# Patient Record
Sex: FEMALE | Race: WHITE
[De-identification: names, ages, dates, MRNs, and addresses within clinical notes are randomized per-mention and may not be internally consistent; named-entity substitution may affect disease eponyms.]

---

## 2017-03-28 ENCOUNTER — HOSPITAL ENCOUNTER (OUTPATIENT)
Dept: HOSPITAL 45 - C.MAMM | Age: 62
Discharge: HOME | End: 2017-03-28
Attending: OBSTETRICS & GYNECOLOGY
Payer: COMMERCIAL

## 2017-03-28 DIAGNOSIS — Z12.31: Primary | ICD-10-CM

## 2017-03-29 NOTE — MAMMOGRAPHY REPORT
BILATERAL DIGITAL SCREENING MAMMOGRAM TOMOSYNTHESIS WITH CAD: 3/28/2017

CLINICAL HISTORY: Routine screening.  Patient has no complaints.  





TECHNIQUE:  Breast tomosynthesis in addition to standard 2D mammography was performed. Current study
 was also evaluated with a Computer Aided Detection (CAD) system.  



COMPARISON: Comparison is made to exam dated:  3/17/2016 mammogram - [facility].  
 



BREAST COMPOSITION:  There are scattered areas of fibroglandular density in both breasts.  



FINDINGS: There are scattered benign-appearing calcifications in the breasts.  No new suspicious mas
s, architectural distortion or cluster of microcalcifications is seen.  



IMPRESSION:  ACR BI-RADS CATEGORY 1: NEGATIVE

There is no mammographic evidence of malignancy. A 1 year screening mammogram is recommended.  The p
atient will receive written notification of the results.  





Approximately 10% of breast cancers are not detected with mammography. A negative mammographic repor
t should not delay biopsy if a clinically suggestive mass is present.



Petra Lee M.D.          

ay/:3/28/2017 16:41:37  



Imaging Technologist: Lucie RATLIFF(EARLINE)(ORTEGA), [facility]

letter sent: Normal 1/2  

BI-RADS Code: ACR BI-RADS Category 1: Negative

## 2017-04-06 ENCOUNTER — HOSPITAL ENCOUNTER (OUTPATIENT)
Dept: HOSPITAL 45 - C.GI | Age: 62
Discharge: HOME | End: 2017-04-06
Attending: INTERNAL MEDICINE
Payer: COMMERCIAL

## 2017-04-06 VITALS
HEIGHT: 65 IN | WEIGHT: 216.45 LBS | HEIGHT: 65 IN | BODY MASS INDEX: 36.06 KG/M2 | WEIGHT: 216.45 LBS | BODY MASS INDEX: 36.06 KG/M2

## 2017-04-06 VITALS — OXYGEN SATURATION: 96 % | DIASTOLIC BLOOD PRESSURE: 69 MMHG | HEART RATE: 68 BPM | SYSTOLIC BLOOD PRESSURE: 115 MMHG

## 2017-04-06 VITALS — TEMPERATURE: 98.78 F

## 2017-04-06 DIAGNOSIS — E07.9: ICD-10-CM

## 2017-04-06 DIAGNOSIS — Z12.11: Primary | ICD-10-CM

## 2017-04-06 DIAGNOSIS — K64.8: ICD-10-CM

## 2017-04-06 DIAGNOSIS — D12.2: ICD-10-CM

## 2017-04-06 DIAGNOSIS — K62.1: ICD-10-CM

## 2017-04-06 DIAGNOSIS — K57.30: ICD-10-CM

## 2017-04-06 NOTE — ANESTHESIOLOGY PROGRESS NOTE
Anesthesia Post Op Note


Date & Time


Apr 6, 2017 at 14:20





Vital Signs


Pain Intensity:  5





 Vital Signs Past 12 Hours








  Date Time  Temp Pulse Resp B/P Pulse Ox O2 Delivery O2 Flow Rate FiO2


 


4/6/17 14:07  70 16 93/63 96 Room Air  


 


4/6/17 13:52  72 16 97/50 92 Room Air  


 


4/6/17 12:37 37.1 86 20 131/67 97 Room Air  











Notes


Mental Status:  alert / awake / arousable, participated in evaluation


Pt Amnestic to Procedure:  Yes


Nausea / Vomiting:  adequately controlled


Pain:  adequately controlled


Airway Patency, RR, SpO2:  stable & adequate


BP & HR:  stable & adequate


Hydration State:  stable & adequate


Anesthetic Complications:  no major complications apparent

## 2017-04-06 NOTE — ENDO HISTORY AND PHYSICAL
History & Physical


Date of Service:


Apr 6, 2017.


Chief Complaint:


SCREENING FOR COLON CANCER


Referring Physician:


DR GEOVANI NEAL


History of Present Illness


61 yo CF who presents for screening colonoscopy.





Past Medical History


Thyroid Disease





Past Surgical History


Hx Cardiac Surgery:  No


Hx Internal Defibrillator:  No


Hx Pacemaker:  No


Hx Abdominal Surgery:  Yes (APPY, D&C)


Hx of Implantable Prosthesis:  No


Hx Post-Op Nausea and Vomiting:  No


Hx Cancer Surgery:  No


Hx Thoracic Surgery:  No


Hx Orthopedic:  No


Hx Urinary Tract Surgery:  No





Family History


Colon CA





Social History


Smoking Status:  Former Smoker


Hx Substance Use:  No


Hx Alcohol Use:  Yes (2 GLASSES OF WINE WITH DINNER (LESS NOW D/T PRE DIABETIC 

DX))





Allergies


Coded Allergies:  


     Tetracycline (Verified  Allergy, Unknown, HIVES, 3/23/17)


Uncoded Allergies:  


     BANDAIDS (Allergy, Mild, RASH, 3/23/17)





Current Medications





 Reported Home Medications








 Medications  Dose


 Route/Sig


 Max Daily Dose Days Date Category


 


 Vitamin D3


  (Cholecalciferol)


 2,000 Unit Tab  1 Tab


 PO QAM


   90 3/23/17 Reported


 


 Vitamin B12


  (Cyanocobalamin)


 1,000 Mcg Tab  1 Tab


 PO QAM


    3/23/17 Reported


 


 Levothyroxine


 Sodium 88 Mcg Tab  1 Tab


 PO QAM


   90 3/23/17 Reported











Vital Signs


Weight (Kilograms):  98.18


Height (Feet):  5


Height (Inches):  5





Physical Exam


General Appearance:  WD/WN, no apparent distress


Respiratory/Chest:  


   Auscultation:  breath sounds normal


Cardiovascular:  


   Heart Auscultation:  RRR


Abdomen:  


   Bowel Sounds:  normal


   Inspection & Palpation:  soft, non-distended, no tenderness, guarding & 

rebound





Assessment and Plan


Assessment:


61 yo CF who presents for screening colonoscopy.








Plan:


Proceed with colonoscopy.

## 2017-04-06 NOTE — GI REPORT
Procedure Date: 4/6/2017 12:54 PM

Procedure:            Colonoscopy

Indications:          Screening for colorectal malignant neoplasm

Medicines:            Monitored Anesthesia Care

Complications:        No immediate complications.

Estimated Blood Loss: Estimated blood loss: none.

Procedure:            Pre-Anesthesia Assessment:

                      - Prior to the procedure, a History and Physical was 

                      performed, and patient medications and allergies were 

                      reviewed. The patient's tolerance of previous 

                      anesthesia was also reviewed. The risks and benefits of 

                      the procedure and the sedation options and risks were 

                      discussed with the patient. All questions were 

                      answered, and informed consent was obtained. Prior 

                      Anticoagulants: The patient has taken no previous 

                      anticoagulant or antiplatelet agents. ASA Grade 

                      Assessment: II - A patient with mild systemic disease. 

                      After reviewing the risks and benefits, the patient was 

                      deemed in satisfactory condition to undergo the 

                      procedure.

                      After I obtained informed consent, the scope was passed 

                      under direct vision. Throughout the procedure, the 

                      patient's blood pressure, pulse, and oxygen saturations 

                      were monitored continuously. The scope was introduced 

                      through the anus and advanced to the cecum, identified 

                      by appendiceal orifice and ileocecal valve. The 

                      colonoscopy was performed without difficulty. The 

                      patient tolerated the procedure well. The quality of 

                      the bowel preparation was good. The terminal ileum, 

                      ileocecal valve, appendiceal orifice, and rectum were 

                      photographed.

Findings:

     Two sessile polyps were found in the rectum and in the ascending colon. 

     The polyps were 5 to 7 mm in size. These polyps were removed with a hot 

     snare. Resection and retrieval were complete.

     A 2 mm polyp was found in the ascending colon. The polyp was sessile. 

     The polyp was removed with a cold biopsy forceps. Resection and 

     retrieval were complete.

     Multiple small-mouthed diverticula were found in the sigmoid colon.

     Non-bleeding internal hemorrhoids were found during retroflexion. The 

     hemorrhoids were small.

Impression:           - Two 5 to 7 mm polyps in the rectum and in the 

                      ascending colon, removed with a hot snare. Resected and 

                      retrieved.

                      - One 2 mm polyp in the ascending colon, removed with a 

                      cold biopsy forceps. Resected and retrieved.

                      - Diverticulosis in the sigmoid colon.

                      - Non-bleeding internal hemorrhoids.

Recommendation:       - Resume previous diet.

                      - Continue present medications.

                      - Repeat colonoscopy date to be determined after 

                      pending pathology results are reviewed for surveillance 

                      based on pathology results.

                      - Return to primary care physician as previously 

                      scheduled.

Fadi Wallis, 

4/6/2017 2:04:10 PM

This report has been signed electronically.

Note Initiated On: 4/6/2017 12:54 PM

     I attest to the content of the Intraoperative Record and orders 

     documented therein, exceptions below

## 2017-04-06 NOTE — DISCHARGE INSTRUCTIONS
Endoscopy Patient Instructions


Date / Procedure(s) Performed


Apr 6, 2017.


Colonoscopy





Allergy Information


Coded Allergies:  


     Tetracycline (Verified  Allergy, Unknown, HIVES, 3/23/17)


Uncoded Allergies:  


     BANDAIDS (Allergy, Mild, RASH, 3/23/17)





Discharge Date / Findings


Apr 6, 2017.


Colon polyps


Diverticulosis


Internal hemorrhoids





Medication Instructions


OK to resume all medications today as prescribed


 Reported Home Medications








 Medications  Dose


 Route/Sig


 Max Daily Dose Days Date Category


 


 Vitamin D3


  (Cholecalciferol)


 2,000 Unit Tab  1 Tab


 PO QAM


   90 3/23/17 Reported


 


 Vitamin B12


  (Cyanocobalamin)


 1,000 Mcg Tab  1 Tab


 PO QAM


    3/23/17 Reported


 


 Levothyroxine


 Sodium 88 Mcg Tab  1 Tab


 PO QAM


   90 3/23/17 Reported











Provider Instructions





Activity Restrictions





-  No exercising or heavy lifting for 24 hours. 


-  Do not drink alcohol the day of the procedure.


-  Do not drive a car or operate machinery until the day after the procedure.


-  Do not make any important decisions or sign important papers in 24 hours 

after the procedure.





Following Day:





-  Return to full activity which may include returning to work/school.





Diet





Start your diet with liquids and light foods (jello, soup, juice, toast).  Then 

eat your usual diet if not nauseated.





Treatment For Common After Affects





For mild abdominal pain, bloating, or excessive gas:





-  Rest


-  Eat lightly


-  Lie on right side





Follow-Up Information


Follow-up with DR GEOVANI NEAL as scheduled





Anesthesia Information





What You Should Know





You have had a procedure that required some medicine to reduce anxiety and 

discomfort. This treatment is called moderate sedation.  


After receiving the treatment, you may be sleepy, but you will be able to 

breathe on your own.  The effects of the treatment may last for several hours.








Follow these instructions along with Activity/Diet recommendations noted above:





*  Do NOT do anything where dizziness or clumsiness would be dangerous.





*  Rest quietly at home today, then you can be up and about tomorrow.





*  Have a responsible person stay with you the rest of today.





*  You may have had an I.V. today.  If so, you may take the dressing off later 

today.





Recommendations


 


Call your doctor if:





*  Trouble breathing 





*  Continuous vomiting for more than 24 hours





*  Temperature above 101 degrees





*  Severe abdominal pain or bloating





*  Pain not relieved by pain medicine ordered





*  There is increased drainage or redness from any incision





*  A large amount of rectal bleeding greater than 2-3 tablespoons. 


   (If you had a polyp/s removed or have hemorrhoids, a small amount of blood -


    from the rectum is to be expected.)





*  You have any unanswered questions or concerns.





IN THE EVENT OF A SERIOUS EMERGENCY, GO TO THE NEAREST EMERGENCY ROOM





       Your discharge instructions were prepared by provider Fadi Wallis.


 Patient Instructions Signature Page








Rosemary Ramos 











Patient (or Guardian) Signature/Date:____________________________________ I 

have read and understand the instructions given to me by my caregivers.








Caregiver/RN/Doctor Signature/Date:____________________________________








The above-named patient and/or guardian has received patient instructions on 

this date.


























+  Original Patient Signature Page (only) stays with chart.  Please make copy 

for patient.

## 2017-09-13 ENCOUNTER — HOSPITAL ENCOUNTER (OUTPATIENT)
Dept: HOSPITAL 45 - C.RAD1850 | Age: 62
Discharge: HOME | End: 2017-09-13
Attending: FAMILY MEDICINE
Payer: COMMERCIAL

## 2017-09-13 DIAGNOSIS — M19.042: Primary | ICD-10-CM

## 2017-09-13 DIAGNOSIS — M19.032: ICD-10-CM

## 2017-09-13 NOTE — DIAGNOSTIC IMAGING REPORT
LEFT HAND MIN 3 VIEWS ROUTINE, LEFT WRIST MIN 3 VIEWS ROUTINE



CLINICAL HISTORY: Left hand and left wrist pain.   



COMPARISON STUDY:  None.



FINDINGS: No fracture or dislocation within the left hand or left wrist. Soft

tissues are unremarkable. 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

Moderate osteoarthritis within the remaining DIP joints. There is mild

osteoarthritis at the radiocarpal and STT joints. No radiopaque foreign bodies.



IMPRESSION:  

1. No fracture or dislocation within the left hand or left wrist.

2. Left hand and wrist osteoarthritis as described above. 









Electronically signed by:  Matthew Elena M.D.

9/13/2017 2:04 PM



Dictated Date/Time:  9/13/2017 2:00 PM

## 2017-12-01 ENCOUNTER — HOSPITAL ENCOUNTER (OUTPATIENT)
Dept: HOSPITAL 45 - C.RDSM | Age: 62
Discharge: HOME | End: 2017-12-01
Attending: ORTHOPAEDIC SURGERY
Payer: COMMERCIAL

## 2017-12-01 DIAGNOSIS — M25.511: ICD-10-CM

## 2017-12-01 DIAGNOSIS — M54.2: Primary | ICD-10-CM

## 2018-04-03 ENCOUNTER — HOSPITAL ENCOUNTER (OUTPATIENT)
Dept: HOSPITAL 45 - C.MAMM | Age: 63
Discharge: HOME | End: 2018-04-03
Attending: OBSTETRICS & GYNECOLOGY
Payer: COMMERCIAL

## 2018-04-03 DIAGNOSIS — Z12.31: Primary | ICD-10-CM

## 2018-04-04 NOTE — MAMMOGRAPHY REPORT
BILATERAL DIGITAL SCREENING MAMMOGRAM TOMOSYNTHESIS WITH CAD: 4/3/2018

CLINICAL HISTORY: Routine screening.  





TECHNIQUE:  Breast tomosynthesis in addition to standard 2D mammography was performed. Current study 
was also evaluated with a Computer Aided Detection (CAD) system.  



COMPARISON: Comparison is made to exams dated:  3/28/2017 mammogram and 3/17/2016 mammogram - [facility].   



BREAST COMPOSITION:  There are scattered areas of fibroglandular density in both breasts.  



FINDINGS: There are a few scattered benign-appearing calcifications.  No suspicious mass, architectur
al distortion or cluster of microcalcifications is seen.  



IMPRESSION:  ACR BI-RADS CATEGORY 1: NEGATIVE

There is no mammographic evidence of malignancy. A 1 year screening mammogram is recommended.  The pa
tient will receive written notification of the results.  





Approximately 10% of breast cancers are not detected with mammography. A negative mammographic report
 should not delay biopsy if a clinically suggestive mass is present.



Petra Lee M.D.          

ay/:4/3/2018 14:25:47  



Imaging Technologist: Lucie RATLIFF(EARLINE)([place]), [facility]

letter sent: Normal 1/2  

BI-RADS Code: ACR BI-RADS Category 1: Negative

## 2018-04-19 ENCOUNTER — HOSPITAL ENCOUNTER (OUTPATIENT)
Dept: HOSPITAL 45 - C.LABSPEC | Age: 63
Discharge: HOME | End: 2018-04-19
Attending: OBSTETRICS & GYNECOLOGY
Payer: COMMERCIAL

## 2018-04-19 DIAGNOSIS — N89.8: Primary | ICD-10-CM
